# Patient Record
Sex: FEMALE | Race: WHITE | NOT HISPANIC OR LATINO
[De-identification: names, ages, dates, MRNs, and addresses within clinical notes are randomized per-mention and may not be internally consistent; named-entity substitution may affect disease eponyms.]

---

## 2020-04-09 PROBLEM — Z00.00 ENCOUNTER FOR PREVENTIVE HEALTH EXAMINATION: Status: ACTIVE | Noted: 2020-04-09

## 2020-06-04 ENCOUNTER — APPOINTMENT (OUTPATIENT)
Dept: UROLOGY | Facility: CLINIC | Age: 50
End: 2020-06-04
Payer: COMMERCIAL

## 2020-06-04 VITALS — WEIGHT: 160 LBS | HEIGHT: 66 IN | BODY MASS INDEX: 25.71 KG/M2

## 2020-06-04 DIAGNOSIS — Z78.9 OTHER SPECIFIED HEALTH STATUS: ICD-10-CM

## 2020-06-04 PROCEDURE — 99203 OFFICE O/P NEW LOW 30 MIN: CPT

## 2020-06-04 NOTE — PHYSICAL EXAM
[General Appearance - Well Developed] : well developed [General Appearance - Well Nourished] : well nourished [Normal Appearance] : normal appearance [Well Groomed] : well groomed [General Appearance - In No Acute Distress] : no acute distress [Edema] : no peripheral edema [Respiration, Rhythm And Depth] : normal respiratory rhythm and effort [Exaggerated Use Of Accessory Muscles For Inspiration] : no accessory muscle use [Abdomen Soft] : soft [Abdomen Tenderness] : non-tender [Costovertebral Angle Tenderness] : no ~M costovertebral angle tenderness [Normal Station and Gait] : the gait and station were normal for the patient's age [] : no rash [Oriented To Time, Place, And Person] : oriented to person, place, and time [No Focal Deficits] : no focal deficits [Affect] : the affect was normal [Mood] : the mood was normal [Not Anxious] : not anxious

## 2020-06-04 NOTE — ASSESSMENT
[FreeTextEntry1] : Asiya is a 49-year-old female who presents for evaluation of right-sided flank pain.\par \par Currently her symptoms have resolved and she is doing well.\par \par -Obtain CT scan to rule out a stone and she will followup w telemedicine for review

## 2020-06-04 NOTE — HISTORY OF PRESENT ILLNESS
[FreeTextEntry1] : Asiya is a 49-year-old female who presents for evaluation of right-sided flank pain.\par \par On 20 she began expressing right-sided flank pain, nonradiating, without gross hematuria, nausea/vomiting, or dysuria.\par \par She presented to an urgent care center where they took a urine sample and started her on an unknown antibiotic. Her pain became worse and she contents taking care again. We discontinued her current antibiotic as push as to what she believes to be Bactrim. She develops allergic reaction to contact the office a third time. I told her her urine testing and culture were negative and stop all antibiotics.To refer her to the emergency room however, she did not want to go secondary to Covid.\par \par Her pain became worse throughout the week until finally resolved on Friday. She denies passing any stone or having respiratory at any time. Since that her pain is completely resolved and she is doing well.\par \par Past medical history: Denies\par Surgical history: \par Family history: Denies history of stones. Mother side as a result of brain cancer likely secondary to metastasis from either the liver or pancreas\par Medications: Denied\par Allergies: Sulfa\par Social history: Denies current/former cigarette smoking, EtOH abuse, or illicit drug use

## 2020-06-29 ENCOUNTER — APPOINTMENT (OUTPATIENT)
Dept: UROLOGY | Facility: CLINIC | Age: 50
End: 2020-06-29
Payer: COMMERCIAL

## 2020-06-29 DIAGNOSIS — G54.8 OTHER NERVE ROOT AND PLEXUS DISORDERS: ICD-10-CM

## 2020-06-29 DIAGNOSIS — R10.9 UNSPECIFIED ABDOMINAL PAIN: ICD-10-CM

## 2020-06-29 PROCEDURE — 99214 OFFICE O/P EST MOD 30 MIN: CPT | Mod: 95

## 2020-06-29 NOTE — HISTORY OF PRESENT ILLNESS
[Home] : at home, [unfilled] , at the time of the visit. [Medical Office: ([facility])___] : at the medical office located in  [Verbal consent obtained from patient] : the patient, [unfilled] [FreeTextEntry1] : Asiya is a 49-year-old female who presents for evaluation of right-sided flank pain now resolved.\par CT a/p images reviewed and the scan is neg for stones.\par Fu prn\par fu w ortho for tarlov cyst and radiculopathy sx\par \par Previously On 20 she began expressing right-sided flank pain, nonradiating, without gross hematuria, nausea/vomiting, or dysuria.\par \par Past medical history: Denies\par Surgical history: \par Family history: Denies history of stones. Mother side as a result of brain cancer likely secondary to metastasis from either the liver or pancreas\par Medications: Denied\par Allergies: Sulfa\par Social history: Denies current/former cigarette smoking, EtOH abuse, or illicit drug use

## 2020-06-29 NOTE — ASSESSMENT
[FreeTextEntry1] : Asiya is a 49-year-old female who presents for evaluation of right-sided flank pain now resolved. CT a/p neg for stones or other  abnormalities \par Fu prn\par fu w ortho for tarlov cyst and radiculopathy sx\par

## 2020-06-29 NOTE — PHYSICAL EXAM
[General Appearance - Well Developed] : well developed [General Appearance - Well Nourished] : well nourished [Normal Appearance] : normal appearance [General Appearance - In No Acute Distress] : no acute distress [Well Groomed] : well groomed [] : no respiratory distress [Edema] : no peripheral edema [Respiration, Rhythm And Depth] : normal respiratory rhythm and effort [Exaggerated Use Of Accessory Muscles For Inspiration] : no accessory muscle use [Oriented To Time, Place, And Person] : oriented to person, place, and time [Not Anxious] : not anxious [Mood] : the mood was normal [Affect] : the affect was normal

## 2022-02-09 NOTE — ADDENDUM
[FreeTextEntry1] : The patient-doctor relationship has been established in a face to face fashion via real time video/audio HIPAA compliant communication using telemedicine software. The patient's identity has been confirmed. The patient was previously emailed a copy of the telemedicine consent. They have had a chance to review and has now given verbal consent and has requested care to be assessed and treated through telemedicine and understands there may be limitations in this process and they may need further follow up care in the office and or hospital settings.\par  negative